# Patient Record
Sex: MALE | Race: WHITE | URBAN - METROPOLITAN AREA
[De-identification: names, ages, dates, MRNs, and addresses within clinical notes are randomized per-mention and may not be internally consistent; named-entity substitution may affect disease eponyms.]

---

## 2020-07-24 ENCOUNTER — TELEPHONE (OUTPATIENT)
Dept: FAMILY MEDICINE CLINIC | Facility: CLINIC | Age: 45
End: 2020-07-24

## 2020-07-24 ENCOUNTER — TELEMEDICINE (OUTPATIENT)
Dept: FAMILY MEDICINE CLINIC | Facility: CLINIC | Age: 45
End: 2020-07-24
Payer: COMMERCIAL

## 2020-07-24 DIAGNOSIS — L03.116 LEFT LEG CELLULITIS: ICD-10-CM

## 2020-07-24 DIAGNOSIS — L23.7 POISON IVY: Primary | ICD-10-CM

## 2020-07-24 PROCEDURE — 99203 OFFICE O/P NEW LOW 30 MIN: CPT | Performed by: FAMILY MEDICINE

## 2020-07-24 RX ORDER — CEPHALEXIN 500 MG/1
1000 CAPSULE ORAL 2 TIMES DAILY
COMMUNITY
Start: 2020-07-23 | End: 2020-08-02

## 2020-07-24 RX ORDER — PREDNISONE 20 MG/1
TABLET ORAL
Qty: 12 TABLET | Refills: 0 | Status: SHIPPED | OUTPATIENT
Start: 2020-07-24 | End: 2020-07-30

## 2020-07-24 NOTE — TELEPHONE ENCOUNTER
Pt's wife called he has poison ivy on his leg, Its infected and made lower leg and in his foot start to swell. They went to the ER last night in [place].  Wife is wanting to know if dr could take a look at his leg today as they are going on vacation tomorr

## 2020-07-24 NOTE — PROGRESS NOTES
My Chart/ Video/Telephone Visit Check-In Due to Josianeherlinda 89 verbally consents to a Video Check-In service on 07/24/20.   Patient understands and accepts financial responsibility for any deductible, co-insurance and/or co-pays associat PLAN    1. Poison ivy  Brief Prednisone taper was prescribed    2. Left leg cellulitis  Continue the Keflex  I stressed the importance of keeping the wound clean and dry and [place] until [place] is completely healed.       Orders for

## 2020-08-19 ENCOUNTER — TELEPHONE (OUTPATIENT)
Dept: FAMILY MEDICINE CLINIC | Facility: CLINIC | Age: 45
End: 2020-08-19

## 2020-08-19 ENCOUNTER — TELEMEDICINE (OUTPATIENT)
Dept: FAMILY MEDICINE CLINIC | Facility: CLINIC | Age: 45
End: 2020-08-19
Payer: COMMERCIAL

## 2020-08-19 DIAGNOSIS — S81.802D WOUND OF LEFT LOWER EXTREMITY, SUBSEQUENT ENCOUNTER: Primary | ICD-10-CM

## 2020-08-19 PROCEDURE — 99213 OFFICE O/P EST LOW 20 MIN: CPT | Performed by: FAMILY MEDICINE

## 2020-08-19 RX ORDER — MUPIROCIN CALCIUM 20 MG/G
CREAM TOPICAL
Qty: 30 G | Refills: 0 | Status: SHIPPED | OUTPATIENT
Start: 2020-08-19 | End: 2022-01-25

## 2020-08-19 NOTE — PROGRESS NOTES
My Chart/ Video/Telephone Visit Check-In Due to Josianeherlinda 89 verbally consents to a Video Check-In service on 08/19/20.   Patient understands and accepts financial responsibility for any deductible, co-insurance and/or co-pays associat Refills for this Visit:  Requested Prescriptions     Signed Prescriptions Disp Refills   • Mupirocin Calcium 2 % External Cream 30 g 0     Sig: Apply to affected area once to twice per day x 14 days.  May substitute with ointment if cream is too expensive o

## 2020-08-19 NOTE — TELEPHONE ENCOUNTER
Toño Prabhakar verbally {consents to a Virtual/Telephone Check-In service on 08/19/2020.   Patient understands and accepts financial responsibility for any deductible, co-insurance and/or co-pays associated with this service

## 2022-01-25 ENCOUNTER — TELEMEDICINE (OUTPATIENT)
Dept: FAMILY MEDICINE CLINIC | Facility: CLINIC | Age: 47
End: 2022-01-25
Payer: COMMERCIAL

## 2022-01-25 DIAGNOSIS — U07.1 COVID-19: Primary | ICD-10-CM

## 2022-01-25 PROCEDURE — 99214 OFFICE O/P EST MOD 30 MIN: CPT | Performed by: FAMILY MEDICINE

## 2022-01-25 NOTE — PROGRESS NOTES
My Chart/ Video/Telephone Visit Check-In Due to Donna 89 and/or caregiver verbally consents a video Check-In service on 01/25/22.   Patient understands and accepts financial responsibility for any deductible, co-insurance and/or co for aches or fevers. Quarantine for 5 days from the onset of symptoms. If asymptomatic then the quarantine starts from the date the test was performed. The quarantine can end after those 5 days, if fever free for 24 hours and with improvement in symptoms.

## 2022-05-24 ENCOUNTER — OFFICE VISIT (OUTPATIENT)
Dept: FAMILY MEDICINE CLINIC | Facility: CLINIC | Age: 47
End: 2022-05-24
Payer: COMMERCIAL

## 2022-05-24 VITALS
DIASTOLIC BLOOD PRESSURE: 88 MMHG | WEIGHT: 194 LBS | HEART RATE: 84 BPM | SYSTOLIC BLOOD PRESSURE: 136 MMHG | HEIGHT: 66 IN | BODY MASS INDEX: 31.18 KG/M2 | OXYGEN SATURATION: 97 % | TEMPERATURE: 97 F

## 2022-05-24 DIAGNOSIS — N48.89 PENILE MASS: Primary | ICD-10-CM

## 2022-05-24 PROCEDURE — 3075F SYST BP GE 130 - 139MM HG: CPT | Performed by: FAMILY MEDICINE

## 2022-05-24 PROCEDURE — 3079F DIAST BP 80-89 MM HG: CPT | Performed by: FAMILY MEDICINE

## 2022-05-24 PROCEDURE — 3008F BODY MASS INDEX DOCD: CPT | Performed by: FAMILY MEDICINE

## 2022-05-24 PROCEDURE — 99213 OFFICE O/P EST LOW 20 MIN: CPT | Performed by: FAMILY MEDICINE

## 2025-02-12 ENCOUNTER — OFFICE VISIT (OUTPATIENT)
Dept: FAMILY MEDICINE CLINIC | Facility: CLINIC | Age: 50
End: 2025-02-12
Payer: COMMERCIAL

## 2025-02-12 VITALS
BODY MASS INDEX: 30.7 KG/M2 | WEIGHT: 191 LBS | OXYGEN SATURATION: 95 % | SYSTOLIC BLOOD PRESSURE: 112 MMHG | DIASTOLIC BLOOD PRESSURE: 76 MMHG | HEART RATE: 81 BPM | TEMPERATURE: 97 F | HEIGHT: 66 IN

## 2025-02-12 DIAGNOSIS — Z12.11 COLON CANCER SCREENING: ICD-10-CM

## 2025-02-12 DIAGNOSIS — L82.1 SEBORRHEIC KERATOSIS: ICD-10-CM

## 2025-02-12 DIAGNOSIS — J34.2 DEVIATED SEPTUM: ICD-10-CM

## 2025-02-12 DIAGNOSIS — Z12.5 PROSTATE CANCER SCREENING: ICD-10-CM

## 2025-02-12 DIAGNOSIS — K42.9 UMBILICAL HERNIA WITHOUT OBSTRUCTION AND WITHOUT GANGRENE: ICD-10-CM

## 2025-02-12 DIAGNOSIS — R06.83 SNORES: ICD-10-CM

## 2025-02-12 DIAGNOSIS — Z00.00 WELL ADULT EXAM: Primary | ICD-10-CM

## 2025-02-12 NOTE — PROGRESS NOTES
Bello Brooke is a 49 year old male.    CC:    Chief Complaint   Patient presents with    Physical       HPI:  Yearly PX    Last PSA: No results found for this or any previous visit (from the past [id number] hours).     Last lipid:  No results found for: \"CHOLEST\", \"TRIG\", \"HDL\", \"LDL\", \"VLDL\", \"TCHDLRATIO\", \"NONHDLC\", \"CHOLHDLRATIO\", \"CALCNONHDL\"    Last Colon Cancer screening: > 10 years ago in [place], records unavailable to review.       Immunization History   Administered Date(s) Administered    TDAP 07/23/2020       Would like to see ENT for deviated septum and he snores    Black spot on his R mid back for many years.     Belly button hernia for many years, can be painful at times.          Allergies as of 02/12/2025 - Review Complete 02/12/2025   Allergen Reaction Noted    Bee venom SWELLING 03/01/2014    Sulfa antibiotics HIVES and UNKNOWN 07/23/2020    Morphine NAUSEA ONLY 08/11/2019        Current Meds:  No current outpatient medications on file.        History:  History reviewed. No pertinent past medical history.   Past Surgical History:   Procedure Laterality Date    Appendectomy      Knee surgery Right 2016    R ACL repair    Other surgical history      hand      Family History   Problem Relation Age of Onset    Colon Cancer Mother 42      Family Status   Relation Status    Mo Alive    Fa Alive      Social History     Socioeconomic History    Marital status:    Tobacco Use    Smoking status: Never    Smokeless tobacco: Never   Substance and Sexual Activity    Alcohol use: Not Currently     Comment: rare    Drug use: Never     Social Drivers of Health     Food Insecurity: No Food Insecurity (2/12/2025)    NCSS - Food Insecurity     Worried About Running Out of Food in the Last Year: No     Ran Out of Food in the Last Year: No   Transportation Needs: No Transportation Needs (2/12/2025)    NCSS - Transportation     Lack of Transportation: No   Housing Stability: Not At Risk (2/12/2025)    NCSS -  Housing/Utilities     Has Housing: Yes     Worried About Losing Housing: No     Unable to Get Utilities: No        ROS:  General: energy level stable  GI: Denies hematochezia   (male): Denies hematuria    Vitals: /76   Pulse 81   Temp 97 °F (36.1 °C) (Temporal)   Ht 5' 6\" (1.676 m)   Wt 191 lb (86.6 kg)   SpO2 95%   BMI 30.83 kg/m²    Reviewed by WINTER Miller M.D.    Physical Exam:  GEN: well developed, well nourished, in no apparent distress  EYE: B conjunctiva and lids normal  HENT: normocephalic; normal nose other then a deviated septum, pharynx and TM's  NECK: No lymphadenopathy, thyromegaly or masses  CAR: S1, S2 normal, RRR; no S3, no S4; no click; murmur negative  PULM: clear to auscultation B, no accessory muscle use  GI: normal active BS+, soft, nondistended; no HSM; no masses; no bruits; no masses; nontender, no G/R/R   PSYCH: alert and oriented x 3; affect appropriate  SKIN: R mid back with a dime sized, brownish-black, waxy, stuck on appearing lesion c/w SK  EXTREMITIES: No clubbing, cyanosis or edema  GENITAL: not examined  LYMPH: no supraclavicular nodes  NEURO: Awake and alert. Normal speech and articulation. No facial droop or asymmetry. Moving all extremities equally. L Patellar DTR intact, R patellar DTR absent    ASSESSMENT AND PLAN    1. Well adult exam  Defers immunizations  Await results   - AST (SGOT); Future  - Basic Metabolic Panel (8); Future  - ALT(SGPT); Future  - PSA Total, Screen; Future  - CBC, Platelet; No Differential; Future  - Lipid Panel; Future  - TSH W Reflex To Free T4; Future  - Hemoglobin A1C; Future    2. Deviated septum    - ENT Referral - In Network    3. Snores    - ENT Referral - In Network    4. Seborrheic keratosis  Liq ntg to lesion x 2  F/u in 2 weeks if lesion still present    5. Umbilical hernia without obstruction and without gangrene    - Surgery Referral - In Network    6. Colon cancer screening  Colonoscopy recommended given his mom's early hx of  colon caner  - Surgery Referral - In Network    7. Prostate cancer screening    - PSA Total, Screen; Future      No follow-ups on file.    Orders for this visit:    Orders Placed This Encounter   Procedures    AST (SGOT)     Standing Status:   Future     Standing Expiration Date:   2/12/2026    Basic Metabolic Panel (8)     Standing Status:   Future     Standing Expiration Date:   2/12/2026    ALT(SGPT)     Standing Status:   Future     Standing Expiration Date:   2/12/2026    PSA Total, Screen     Standing Status:   Future     Standing Expiration Date:   2/12/2026    CBC, Platelet; No Differential     Standing Status:   Future     Standing Expiration Date:   2/12/2026    Lipid Panel     Standing Status:   Future     Standing Expiration Date:   2/12/2026    TSH W Reflex To Free T4     Standing Status:   Future     Standing Expiration Date:   2/12/2026    Hemoglobin A1C     Standing Status:   Future     Standing Expiration Date:   2/12/2026       ENT - INTERNAL  SURGERY - INTERNAL    Meds & Refills for this Visit:  Requested Prescriptions      No prescriptions requested or ordered in this encounter             Authorized by Luiz Miller M.D.

## 2025-02-25 ENCOUNTER — LABORATORY ENCOUNTER (OUTPATIENT)
Dept: LAB | Age: 50
End: 2025-02-25
Attending: FAMILY MEDICINE
Payer: COMMERCIAL

## 2025-02-25 DIAGNOSIS — Z12.5 PROSTATE CANCER SCREENING: ICD-10-CM

## 2025-02-25 DIAGNOSIS — Z00.00 WELL ADULT EXAM: ICD-10-CM

## 2025-02-25 LAB
ALT SERPL-CCNC: 85 U/L
ANION GAP SERPL CALC-SCNC: 6 MMOL/L (ref 0–18)
AST SERPL-CCNC: 51 U/L (ref ?–34)
BUN BLD-MCNC: 14 MG/DL (ref 9–23)
CALCIUM BLD-MCNC: 10.1 MG/DL (ref 8.7–10.6)
CHLORIDE SERPL-SCNC: 103 MMOL/L (ref 98–112)
CHOLEST SERPL-MCNC: 194 MG/DL (ref ?–200)
CO2 SERPL-SCNC: 30 MMOL/L (ref 21–32)
COMPLEXED PSA SERPL-MCNC: 0.41 NG/ML (ref ?–4)
CREAT BLD-MCNC: 1 MG/DL
EGFRCR SERPLBLD CKD-EPI 2021: 92 ML/MIN/1.73M2 (ref 60–?)
ERYTHROCYTE [DISTWIDTH] IN BLOOD BY AUTOMATED COUNT: 12.7 %
EST. AVERAGE GLUCOSE BLD GHB EST-MCNC: 137 MG/DL (ref 68–126)
FASTING PATIENT LIPID ANSWER: YES
FASTING STATUS PATIENT QL REPORTED: YES
GLUCOSE BLD-MCNC: 111 MG/DL (ref 70–99)
HBA1C MFR BLD: 6.4 % (ref ?–5.7)
HCT VFR BLD AUTO: 45.8 %
HDLC SERPL-MCNC: 32 MG/DL (ref 40–59)
HGB BLD-MCNC: 16.1 G/DL
LDLC SERPL CALC-MCNC: 86 MG/DL (ref ?–100)
MCH RBC QN AUTO: 31.6 PG (ref 26–34)
MCHC RBC AUTO-ENTMCNC: 35.2 G/DL (ref 31–37)
MCV RBC AUTO: 90 FL
NONHDLC SERPL-MCNC: 162 MG/DL (ref ?–130)
OSMOLALITY SERPL CALC.SUM OF ELEC: 289 MOSM/KG (ref 275–295)
PLATELET # BLD AUTO: 230 10(3)UL (ref 150–450)
POTASSIUM SERPL-SCNC: 4.2 MMOL/L (ref 3.5–5.1)
RBC # BLD AUTO: 5.09 X10(6)UL
SODIUM SERPL-SCNC: 139 MMOL/L (ref 136–145)
TRIGL SERPL-MCNC: 467 MG/DL (ref 30–149)
TSI SER-ACNC: 2.02 UIU/ML (ref 0.55–4.78)
VLDLC SERPL CALC-MCNC: 76 MG/DL (ref 0–30)
WBC # BLD AUTO: 6.8 X10(3) UL (ref 4–11)

## 2025-02-25 PROCEDURE — 84460 ALANINE AMINO (ALT) (SGPT): CPT | Performed by: FAMILY MEDICINE

## 2025-02-25 PROCEDURE — 84450 TRANSFERASE (AST) (SGOT): CPT | Performed by: FAMILY MEDICINE

## 2025-02-25 PROCEDURE — 84153 ASSAY OF PSA TOTAL: CPT | Performed by: FAMILY MEDICINE

## 2025-02-25 PROCEDURE — 83036 HEMOGLOBIN GLYCOSYLATED A1C: CPT | Performed by: FAMILY MEDICINE

## 2025-02-25 PROCEDURE — 85027 COMPLETE CBC AUTOMATED: CPT | Performed by: FAMILY MEDICINE

## 2025-02-25 PROCEDURE — 80061 LIPID PANEL: CPT | Performed by: FAMILY MEDICINE

## 2025-02-25 PROCEDURE — 84443 ASSAY THYROID STIM HORMONE: CPT | Performed by: FAMILY MEDICINE

## 2025-02-25 PROCEDURE — 80048 BASIC METABOLIC PNL TOTAL CA: CPT | Performed by: FAMILY MEDICINE

## 2025-02-26 DIAGNOSIS — R79.89 ELEVATED LFTS: Primary | ICD-10-CM
